# Patient Record
Sex: FEMALE | Race: WHITE | NOT HISPANIC OR LATINO | Employment: FULL TIME | URBAN - METROPOLITAN AREA
[De-identification: names, ages, dates, MRNs, and addresses within clinical notes are randomized per-mention and may not be internally consistent; named-entity substitution may affect disease eponyms.]

---

## 2019-06-04 ENCOUNTER — PATIENT OUTREACH (OUTPATIENT)
Dept: CARE COORDINATION | Facility: CLINIC | Age: 24
End: 2019-06-04

## 2019-06-04 DIAGNOSIS — F41.9 ANXIETY: Primary | ICD-10-CM

## 2019-06-04 NOTE — LETTER
June 12, 2019    Estefany Arita  [address]   [facility] [address]      Dear Estefany,    I am a clinic care coordinator who works with LINDSAY Cuellar at [facility]. I recently tried to call and was unable to reach you. I wanted to introduce myself and provide you with my contact information so that you can call me with questions or concerns about your health care. Below is a description of clinic care coordination and how I can further assist you.     The clinic care coordinator is a  who understand the health care system. The goal of clinic care coordination is to help you manage your health and improve access to the health care system in the most efficient manner. The  can assist you with financial, behavioral, psychosocial, chemical dependency, counseling, and/or psychiatric resources.    Please feel free to contact me at [phone], with any questions or concerns.     Sincerely,     Emili Hou

## 2019-06-04 NOTE — PROGRESS NOTES
Clinic Care Coordination Contact  [facility]/Voicemail    Referral Source: PCP  Clinical Data: Care Coordinator Outreach  Outreach attempted x 2.  Left message on voicemail with call back information and requested return call. Please see contact log for detailed outreach attempt dates.  Plan: Care Coordinator will mail out care coordination introduction letter with care coordinator contact information and explanation of care coordination services. Care Coordinator will do no further outreaches at this time.    DANIS Ravi  Clinic Care Coordinator  [phone]

## 2022-08-22 ENCOUNTER — TRANSFERRED RECORDS (OUTPATIENT)
Dept: HEALTH INFORMATION MANAGEMENT | Facility: CLINIC | Age: 27
End: 2022-08-22

## 2022-12-30 ENCOUNTER — TRANSCRIBE ORDERS (OUTPATIENT)
Dept: MATERNAL FETAL MEDICINE | Facility: HOSPITAL | Age: 27
End: 2022-12-30

## 2022-12-30 ENCOUNTER — MEDICAL CORRESPONDENCE (OUTPATIENT)
Dept: HEALTH INFORMATION MANAGEMENT | Facility: CLINIC | Age: 27
End: 2022-12-30

## 2022-12-30 DIAGNOSIS — O26.90 PREGNANCY RELATED CONDITION, ANTEPARTUM: Primary | ICD-10-CM

## 2023-01-02 ENCOUNTER — TRANSCRIBE ORDERS (OUTPATIENT)
Dept: MATERNAL FETAL MEDICINE | Facility: HOSPITAL | Age: 28
End: 2023-01-02

## 2023-01-02 DIAGNOSIS — O26.90 PREGNANCY RELATED CONDITION, ANTEPARTUM: Primary | ICD-10-CM

## 2023-01-04 ENCOUNTER — PRE VISIT (OUTPATIENT)
Dept: MATERNAL FETAL MEDICINE | Facility: HOSPITAL | Age: 28
End: 2023-01-04

## 2023-01-05 ENCOUNTER — OFFICE VISIT (OUTPATIENT)
Dept: MATERNAL FETAL MEDICINE | Facility: HOSPITAL | Age: 28
End: 2023-01-05
Attending: ADVANCED PRACTICE MIDWIFE
Payer: COMMERCIAL

## 2023-01-05 ENCOUNTER — ANCILLARY PROCEDURE (OUTPATIENT)
Dept: ULTRASOUND IMAGING | Facility: HOSPITAL | Age: 28
End: 2023-01-05
Attending: ADVANCED PRACTICE MIDWIFE
Payer: COMMERCIAL

## 2023-01-05 DIAGNOSIS — O26.90 PREGNANCY RELATED CONDITION, ANTEPARTUM: ICD-10-CM

## 2023-01-05 DIAGNOSIS — O98.512 COVID-19 AFFECTING PREGNANCY IN SECOND TRIMESTER: ICD-10-CM

## 2023-01-05 DIAGNOSIS — U07.1 COVID-19 AFFECTING PREGNANCY IN SECOND TRIMESTER: ICD-10-CM

## 2023-01-05 DIAGNOSIS — O36.5920 MATERNAL CARE FOR OTHER KNOWN OR SUSPECTED POOR FETAL GROWTH, SECOND TRIMESTER, NOT APPLICABLE OR UNSPECIFIED: Primary | ICD-10-CM

## 2023-01-05 PROCEDURE — 76811 OB US DETAILED SNGL FETUS: CPT | Mod: 26 | Performed by: OBSTETRICS & GYNECOLOGY

## 2023-01-05 PROCEDURE — 99202 OFFICE O/P NEW SF 15 MIN: CPT | Mod: 25 | Performed by: OBSTETRICS & GYNECOLOGY

## 2023-01-05 PROCEDURE — 76811 OB US DETAILED SNGL FETUS: CPT

## 2023-01-05 NOTE — PROGRESS NOTES
"Please see \"Imaging\" tab under \"Chart Review\" for details of today's visit.    Beatrice Muñoz    "

## 2023-01-20 LAB
ABO (EXTERNAL): NORMAL
HEPATITIS B SURFACE ANTIGEN (EXTERNAL): NEGATIVE
RH (EXTERNAL): NEGATIVE
RUBELLA ANTIBODY IGG (EXTERNAL): NORMAL
TREPONEMA PALLIDUM ANTIBODY (EXTERNAL): NONREACTIVE

## 2023-02-09 ENCOUNTER — ANCILLARY PROCEDURE (OUTPATIENT)
Dept: ULTRASOUND IMAGING | Facility: HOSPITAL | Age: 28
End: 2023-02-09
Attending: OBSTETRICS & GYNECOLOGY
Payer: COMMERCIAL

## 2023-02-09 ENCOUNTER — OFFICE VISIT (OUTPATIENT)
Dept: MATERNAL FETAL MEDICINE | Facility: HOSPITAL | Age: 28
End: 2023-02-09
Attending: OBSTETRICS & GYNECOLOGY
Payer: COMMERCIAL

## 2023-02-09 DIAGNOSIS — O98.512 COVID-19 AFFECTING PREGNANCY IN SECOND TRIMESTER: ICD-10-CM

## 2023-02-09 DIAGNOSIS — Z03.74 FETAL GROWTH PROBLEM SUSPECTED BUT NOT FOUND: Primary | ICD-10-CM

## 2023-02-09 DIAGNOSIS — U07.1 COVID-19 AFFECTING PREGNANCY IN SECOND TRIMESTER: ICD-10-CM

## 2023-02-09 PROCEDURE — 76816 OB US FOLLOW-UP PER FETUS: CPT

## 2023-02-09 PROCEDURE — 76816 OB US FOLLOW-UP PER FETUS: CPT | Mod: 26 | Performed by: OBSTETRICS & GYNECOLOGY

## 2023-02-09 PROCEDURE — 99207 PR NO CHARGE LOS: CPT | Performed by: OBSTETRICS & GYNECOLOGY

## 2023-02-09 NOTE — NURSING NOTE
Patient reports good fetal movements, denies pain, denies contractions, leaking of fluid, or bleeding. SBAR given to MATHIEU MARTIN, see their note in Epic.

## 2023-02-09 NOTE — PROGRESS NOTES
Please see the imaging tab for details of the ultrasound performed today.    Jennifer Stanley MD  Specialist in Maternal-Fetal Medicine

## 2023-03-13 LAB — GROUP B STREPTOCOCCUS (EXTERNAL): POSITIVE

## 2023-03-18 ENCOUNTER — HOSPITAL ENCOUNTER (OUTPATIENT)
Facility: HOSPITAL | Age: 28
Discharge: HOME | End: 2023-03-18
Attending: ADVANCED PRACTICE MIDWIFE | Admitting: ADVANCED PRACTICE MIDWIFE
Payer: COMMERCIAL

## 2023-03-18 VITALS — DIASTOLIC BLOOD PRESSURE: 72 MMHG | RESPIRATION RATE: 16 BRPM | SYSTOLIC BLOOD PRESSURE: 133 MMHG | TEMPERATURE: 98.3 F

## 2023-03-18 LAB
CLUE CELLS: NORMAL
RUPTURE OF FETAL MEMBRANES BY ROM PLUS: NEGATIVE
TRICHOMONAS, WET PREP: NORMAL
WBC'S/HIGH POWER FIELD, WET PREP: NORMAL
YEAST, WET PREP: NORMAL

## 2023-03-18 PROCEDURE — 87210 SMEAR WET MOUNT SALINE/INK: CPT | Performed by: ADVANCED PRACTICE MIDWIFE

## 2023-03-18 PROCEDURE — 84112 EVAL AMNIOTIC FLUID PROTEIN: CPT | Performed by: ADVANCED PRACTICE MIDWIFE

## 2023-03-18 PROCEDURE — G0463 HOSPITAL OUTPT CLINIC VISIT: HCPCS

## 2023-03-18 RX ORDER — VITAMIN A ACETATE, .BETA.-CAROTENE, ASCORBIC ACID, CHOLECALCIFEROL, .ALPHA.-TOCOPHEROL ACETATE, DL-, THIAMINE MONONITRATE, RIBOFLAVIN, NIACINAMIDE, PYRIDOXINE HYDROCHLORIDE, FOLIC ACID, CYANOCOBALAMIN, CALCIUM CARBONATE, FERROUS FUMARATE, ZINC OXIDE, AND CUPRIC OXIDE 2000; 2000; 120; 400; 22; 1.84; 3; 20; 10; 1; 12; 200; 27; 25; 2 [IU]/1; [IU]/1; MG/1; [IU]/1; MG/1; MG/1; MG/1; MG/1; MG/1; MG/1; UG/1; MG/1; MG/1; MG/1; MG/1
1 TABLET ORAL DAILY
COMMUNITY

## 2023-03-18 RX ORDER — LIDOCAINE 40 MG/G
CREAM TOPICAL
Status: DISCONTINUED | OUTPATIENT
Start: 2023-03-18 | End: 2023-03-18 | Stop reason: HOSPADM

## 2023-03-18 RX ORDER — ASPIRIN 81 MG/1
81 TABLET, CHEWABLE ORAL DAILY
Status: ON HOLD | COMMUNITY
End: 2023-03-27

## 2023-03-18 ASSESSMENT — ACTIVITIES OF DAILY LIVING (ADL): ADLS_ACUITY_SCORE: 31

## 2023-03-18 NOTE — PROGRESS NOTES
Estefany, a 28 year-old  at 37w1d gestation presents to the [facility] reporting having leaking of fluid vs urinating with coughing through the day on 3/17. She reports that she laid down in bed and either lost a lot of urine or had SROM and was directed to [facility]. She reports that she has been experiencing some labial discomfort and pressure.    O: /72 (BP Location: Right arm, Patient Position: Sitting, Cuff Size: Adult Regular)   Temp 98.3  F (36.8  C) (Oral)   Resp 16   LMP 2022   EFM: 135 baseline, moderate variability, +accelerations, no decelerations. There are periods where maternal heart rate is traced due to positioning per RN  [place]: irregular; patient reports they are not strong  SVE: 2/60/-2    Recent Results (from the past 24 hour(s))   Rupture of Fetal Membranes by ROM Plus    Collection Time: 23 12:51 AM   Result Value Ref Range    Rupture of Fetal Membranes by ROM Plus Negative Negative, Invalid, Suggest Repeat   Wet preparation    Collection Time: 23  1:39 AM    Specimen: Vagina; Swab   Result Value Ref Range    Trichomonas Absent Absent    Yeast Absent Absent    Clue Cells Absent Absent    WBCs/high power field None None     A: 28 year-old  at 37w1d with stress incontinence    P: Reviewed labs, plan to discharge to home. Reviewed s/s of labor and when to return to [facility].    VESNA Redd CNM

## 2023-03-18 NOTE — DISCHARGE INSTRUCTIONS
Discharge Instruction for Undelivered Patients      You were seen for: Membrane Assessment  We Consulted: Joseline Witt  You had (Test or Medicine):ROM+, wet prep    Diet:   Drink 8 to 12 glasses of liquids (milk, juice, water) every day.  You may eat meals and snacks.     Activity:  Count fetal kicks everyday (see handout)  Call your doctor or nurse midwife if your baby is moving less than usual.     Call your provider if you notice:  Swelling in your face or increased swelling in your hands or legs.  Headaches that are not relieved by Tylenol (acetaminophen).  Changes in your vision (blurring: seeing spots or stars.)  Nausea (sick to your stomach) and vomiting (throwing up).   Weight gain of 5 pounds or more per week.  Heartburn that doesn't go away.  Signs of bladder infection: pain when you urinate (use the toilet), need to go more often and more urgently.  The bag of wright (rupture of membranes) breaks, or you notice leaking in your underwear.  Bright red blood in your underwear.  Abdominal (lower belly) or stomach pain.  For first baby: Contractions (tightening) less than 5 minutes apart for one hour or more.  Second (plus) baby: Contractions (tightening) less than 10 minutes apart and getting stronger.  *If less than 34 weeks: Contractions (tightening) more than 6 times in one hour.  Increase or change in vaginal discharge (note the color and amount)  Other:     Follow-up:  As scheduled in the clinic

## 2023-03-18 NOTE — PROGRESS NOTES
"Patient arrives to [facility] accompanied by  Nain. Patient reports possible leaking of fluid today. Patient reports continued leaking with coughing. VSS. Patient also reports lower abdominal pressure. No fluid noted on exam and patient reports vaginal discomfort described as \"feeling like I sat on a bike seat for a long time\". ROM+ obtained and pending.   "

## 2023-03-18 NOTE — PROGRESS NOTES
ROM+ and wet prep negative. Order for discharge to home at this time. EFM held in place to obtain reactive tracing. Positive fetal movement palpated. Discharge instructions reviewed with patient and ; deny questions. Discharged home at this time.

## 2023-03-20 ENCOUNTER — HOSPITAL ENCOUNTER (OUTPATIENT)
Facility: HOSPITAL | Age: 28
Discharge: HOME | End: 2023-03-20
Attending: ADVANCED PRACTICE MIDWIFE | Admitting: ADVANCED PRACTICE MIDWIFE
Payer: COMMERCIAL

## 2023-03-20 VITALS — TEMPERATURE: 98.4 F | RESPIRATION RATE: 18 BRPM | SYSTOLIC BLOOD PRESSURE: 127 MMHG | DIASTOLIC BLOOD PRESSURE: 72 MMHG

## 2023-03-20 LAB
CRYSTALS AMN MICRO: NORMAL
RUPTURE OF FETAL MEMBRANES BY ROM PLUS: NEGATIVE

## 2023-03-20 PROCEDURE — G0463 HOSPITAL OUTPT CLINIC VISIT: HCPCS

## 2023-03-20 PROCEDURE — 84112 EVAL AMNIOTIC FLUID PROTEIN: CPT | Performed by: ADVANCED PRACTICE MIDWIFE

## 2023-03-20 RX ORDER — LIDOCAINE 40 MG/G
CREAM TOPICAL
Status: DISCONTINUED | OUTPATIENT
Start: 2023-03-20 | End: 2023-03-20 | Stop reason: HOSPADM

## 2023-03-20 ASSESSMENT — ACTIVITIES OF DAILY LIVING (ADL): ADLS_ACUITY_SCORE: 35

## 2023-03-20 NOTE — PROGRESS NOTES
Estefany presents to [facility] after episode of leaking. She states the leaking soaked her underwear but did not continue. Reports off and on periods of mild cramping. Fern slide collected. No obvious pool of liquid visualized. ROM plus also collected. Will await results.

## 2023-03-25 ENCOUNTER — HOSPITAL ENCOUNTER (INPATIENT)
Facility: HOSPITAL | Age: 28
LOS: 1 days | Discharge: HOME HEALTH | End: 2023-03-27
Attending: ADVANCED PRACTICE MIDWIFE | Admitting: ADVANCED PRACTICE MIDWIFE
Payer: COMMERCIAL

## 2023-03-25 DIAGNOSIS — O34.219 VBAC, DELIVERED: ICD-10-CM

## 2023-03-25 DIAGNOSIS — Z37.9 NORMAL LABOR: ICD-10-CM

## 2023-03-26 ENCOUNTER — ANESTHESIA (OUTPATIENT)
Dept: OBGYN | Facility: HOSPITAL | Age: 28
End: 2023-03-26
Payer: COMMERCIAL

## 2023-03-26 ENCOUNTER — ANESTHESIA EVENT (OUTPATIENT)
Dept: OBGYN | Facility: HOSPITAL | Age: 28
End: 2023-03-26
Payer: COMMERCIAL

## 2023-03-26 PROBLEM — Z37.9 NORMAL LABOR: Status: ACTIVE | Noted: 2023-03-26

## 2023-03-26 LAB
ABO/RH(D): NORMAL
ABO/RH(D): NORMAL
ANTIBODY SCREEN: NEGATIVE
FETAL BLEED SCREEN: NORMAL
HGB BLD-MCNC: 13.1 G/DL (ref 11.7–15.7)
HOLD SPECIMEN: NORMAL
PLATELET # BLD AUTO: 197 10E3/UL (ref 150–450)
SPECIMEN EXPIRATION DATE: NORMAL
SPECIMEN EXPIRATION DATE: NORMAL
T PALLIDUM AB SER QL: NONREACTIVE

## 2023-03-26 PROCEDURE — 250N000011 HC RX IP 250 OP 636: Performed by: ADVANCED PRACTICE MIDWIFE

## 2023-03-26 PROCEDURE — 36415 COLL VENOUS BLD VENIPUNCTURE: CPT | Performed by: OBSTETRICS & GYNECOLOGY

## 2023-03-26 PROCEDURE — 00HU33Z INSERTION OF INFUSION DEVICE INTO SPINAL CANAL, PERCUTANEOUS APPROACH: ICD-10-PCS | Performed by: ANESTHESIOLOGY

## 2023-03-26 PROCEDURE — 85049 AUTOMATED PLATELET COUNT: CPT | Performed by: ADVANCED PRACTICE MIDWIFE

## 2023-03-26 PROCEDURE — 86850 RBC ANTIBODY SCREEN: CPT | Performed by: ADVANCED PRACTICE MIDWIFE

## 2023-03-26 PROCEDURE — 85018 HEMOGLOBIN: CPT | Performed by: ADVANCED PRACTICE MIDWIFE

## 2023-03-26 PROCEDURE — 85461 HEMOGLOBIN FETAL: CPT | Performed by: OBSTETRICS & GYNECOLOGY

## 2023-03-26 PROCEDURE — 36415 COLL VENOUS BLD VENIPUNCTURE: CPT | Performed by: ADVANCED PRACTICE MIDWIFE

## 2023-03-26 PROCEDURE — 250N000011 HC RX IP 250 OP 636: Performed by: ANESTHESIOLOGY

## 2023-03-26 PROCEDURE — 250N000009 HC RX 250: Performed by: ADVANCED PRACTICE MIDWIFE

## 2023-03-26 PROCEDURE — 87389 HIV-1 AG W/HIV-1&-2 AB AG IA: CPT | Performed by: ADVANCED PRACTICE MIDWIFE

## 2023-03-26 PROCEDURE — 370N000003 HC ANESTHESIA WARD SERVICE

## 2023-03-26 PROCEDURE — 3E0R3BZ INTRODUCTION OF ANESTHETIC AGENT INTO SPINAL CANAL, PERCUTANEOUS APPROACH: ICD-10-PCS | Performed by: ANESTHESIOLOGY

## 2023-03-26 PROCEDURE — 722N000001 HC LABOR CARE VAGINAL DELIVERY SINGLE

## 2023-03-26 PROCEDURE — 0HQ9XZZ REPAIR PERINEUM SKIN, EXTERNAL APPROACH: ICD-10-PCS | Performed by: ADVANCED PRACTICE MIDWIFE

## 2023-03-26 PROCEDURE — 250N000013 HC RX MED GY IP 250 OP 250 PS 637: Performed by: ADVANCED PRACTICE MIDWIFE

## 2023-03-26 PROCEDURE — 86780 TREPONEMA PALLIDUM: CPT | Performed by: ADVANCED PRACTICE MIDWIFE

## 2023-03-26 PROCEDURE — 120N000001 HC R&B MED SURG/OB

## 2023-03-26 PROCEDURE — 10907ZC DRAINAGE OF AMNIOTIC FLUID, THERAPEUTIC FROM PRODUCTS OF CONCEPTION, VIA NATURAL OR ARTIFICIAL OPENING: ICD-10-PCS | Performed by: ADVANCED PRACTICE MIDWIFE

## 2023-03-26 RX ORDER — PENICILLIN G 3000000 [IU]/50ML
3 INJECTION, SOLUTION INTRAVENOUS EVERY 4 HOURS
Status: DISCONTINUED | OUTPATIENT
Start: 2023-03-26 | End: 2023-03-26 | Stop reason: HOSPADM

## 2023-03-26 RX ORDER — CARBOPROST TROMETHAMINE 250 UG/ML
250 INJECTION, SOLUTION INTRAMUSCULAR
Status: DISCONTINUED | OUTPATIENT
Start: 2023-03-26 | End: 2023-03-27 | Stop reason: HOSPADM

## 2023-03-26 RX ORDER — NALOXONE HYDROCHLORIDE 0.4 MG/ML
0.4 INJECTION, SOLUTION INTRAMUSCULAR; INTRAVENOUS; SUBCUTANEOUS
Status: DISCONTINUED | OUTPATIENT
Start: 2023-03-26 | End: 2023-03-26 | Stop reason: HOSPADM

## 2023-03-26 RX ORDER — PROCHLORPERAZINE MALEATE 10 MG
10 TABLET ORAL EVERY 6 HOURS PRN
Status: DISCONTINUED | OUTPATIENT
Start: 2023-03-26 | End: 2023-03-26 | Stop reason: HOSPADM

## 2023-03-26 RX ORDER — FENTANYL CITRATE-0.9 % NACL/PF 10 MCG/ML
100 PLASTIC BAG, INJECTION (ML) INTRAVENOUS EVERY 5 MIN PRN
Status: DISCONTINUED | OUTPATIENT
Start: 2023-03-26 | End: 2023-03-26 | Stop reason: HOSPADM

## 2023-03-26 RX ORDER — NALOXONE HYDROCHLORIDE 0.4 MG/ML
0.2 INJECTION, SOLUTION INTRAMUSCULAR; INTRAVENOUS; SUBCUTANEOUS
Status: DISCONTINUED | OUTPATIENT
Start: 2023-03-26 | End: 2023-03-27 | Stop reason: HOSPADM

## 2023-03-26 RX ORDER — NALOXONE HYDROCHLORIDE 0.4 MG/ML
0.2 INJECTION, SOLUTION INTRAMUSCULAR; INTRAVENOUS; SUBCUTANEOUS
Status: DISCONTINUED | OUTPATIENT
Start: 2023-03-26 | End: 2023-03-26 | Stop reason: HOSPADM

## 2023-03-26 RX ORDER — CARBOPROST TROMETHAMINE 250 UG/ML
250 INJECTION, SOLUTION INTRAMUSCULAR
Status: DISCONTINUED | OUTPATIENT
Start: 2023-03-26 | End: 2023-03-26 | Stop reason: HOSPADM

## 2023-03-26 RX ORDER — ONDANSETRON 4 MG/1
4 TABLET, ORALLY DISINTEGRATING ORAL EVERY 6 HOURS PRN
Status: DISCONTINUED | OUTPATIENT
Start: 2023-03-26 | End: 2023-03-26 | Stop reason: HOSPADM

## 2023-03-26 RX ORDER — OXYCODONE HYDROCHLORIDE 5 MG/1
5 TABLET ORAL
Status: DISCONTINUED | OUTPATIENT
Start: 2023-03-26 | End: 2023-03-27 | Stop reason: HOSPADM

## 2023-03-26 RX ORDER — MISOPROSTOL 200 UG/1
400 TABLET ORAL
Status: DISCONTINUED | OUTPATIENT
Start: 2023-03-26 | End: 2023-03-26 | Stop reason: HOSPADM

## 2023-03-26 RX ORDER — NALOXONE HYDROCHLORIDE 0.4 MG/ML
0.4 INJECTION, SOLUTION INTRAMUSCULAR; INTRAVENOUS; SUBCUTANEOUS
Status: DISCONTINUED | OUTPATIENT
Start: 2023-03-26 | End: 2023-03-27 | Stop reason: HOSPADM

## 2023-03-26 RX ORDER — KETOROLAC TROMETHAMINE 30 MG/ML
30 INJECTION, SOLUTION INTRAMUSCULAR; INTRAVENOUS
Status: DISCONTINUED | OUTPATIENT
Start: 2023-03-26 | End: 2023-03-27 | Stop reason: HOSPADM

## 2023-03-26 RX ORDER — OXYTOCIN 10 [USP'U]/ML
10 INJECTION, SOLUTION INTRAMUSCULAR; INTRAVENOUS
Status: DISCONTINUED | OUTPATIENT
Start: 2023-03-26 | End: 2023-03-26 | Stop reason: HOSPADM

## 2023-03-26 RX ORDER — METHYLERGONOVINE MALEATE 0.2 MG/ML
200 INJECTION INTRAVENOUS
Status: DISCONTINUED | OUTPATIENT
Start: 2023-03-26 | End: 2023-03-27 | Stop reason: HOSPADM

## 2023-03-26 RX ORDER — FENTANYL CITRATE 50 UG/ML
50 INJECTION, SOLUTION INTRAMUSCULAR; INTRAVENOUS EVERY 30 MIN PRN
Status: DISCONTINUED | OUTPATIENT
Start: 2023-03-26 | End: 2023-03-26 | Stop reason: HOSPADM

## 2023-03-26 RX ORDER — ONDANSETRON 2 MG/ML
4 INJECTION INTRAMUSCULAR; INTRAVENOUS EVERY 6 HOURS PRN
Status: DISCONTINUED | OUTPATIENT
Start: 2023-03-26 | End: 2023-03-26 | Stop reason: HOSPADM

## 2023-03-26 RX ORDER — HYDROCORTISONE 25 MG/G
CREAM TOPICAL 3 TIMES DAILY PRN
Status: DISCONTINUED | OUTPATIENT
Start: 2023-03-26 | End: 2023-03-27 | Stop reason: HOSPADM

## 2023-03-26 RX ORDER — LIDOCAINE 40 MG/G
CREAM TOPICAL
Status: DISCONTINUED | OUTPATIENT
Start: 2023-03-26 | End: 2023-03-26 | Stop reason: HOSPADM

## 2023-03-26 RX ORDER — NALBUPHINE HYDROCHLORIDE 10 MG/ML
2.5-5 INJECTION, SOLUTION INTRAMUSCULAR; INTRAVENOUS; SUBCUTANEOUS EVERY 6 HOURS PRN
Status: DISCONTINUED | OUTPATIENT
Start: 2023-03-26 | End: 2023-03-27 | Stop reason: HOSPADM

## 2023-03-26 RX ORDER — METOCLOPRAMIDE 10 MG/1
10 TABLET ORAL EVERY 6 HOURS PRN
Status: DISCONTINUED | OUTPATIENT
Start: 2023-03-26 | End: 2023-03-26 | Stop reason: HOSPADM

## 2023-03-26 RX ORDER — OXYTOCIN 10 [USP'U]/ML
10 INJECTION, SOLUTION INTRAMUSCULAR; INTRAVENOUS
Status: DISCONTINUED | OUTPATIENT
Start: 2023-03-26 | End: 2023-03-27 | Stop reason: HOSPADM

## 2023-03-26 RX ORDER — OXYTOCIN/0.9 % SODIUM CHLORIDE 30/500 ML
340 PLASTIC BAG, INJECTION (ML) INTRAVENOUS CONTINUOUS PRN
Status: DISCONTINUED | OUTPATIENT
Start: 2023-03-26 | End: 2023-03-27 | Stop reason: HOSPADM

## 2023-03-26 RX ORDER — BISACODYL 10 MG
10 SUPPOSITORY, RECTAL RECTAL DAILY PRN
Status: DISCONTINUED | OUTPATIENT
Start: 2023-03-26 | End: 2023-03-27 | Stop reason: HOSPADM

## 2023-03-26 RX ORDER — MODIFIED LANOLIN
OINTMENT (GRAM) TOPICAL
Status: DISCONTINUED | OUTPATIENT
Start: 2023-03-26 | End: 2023-03-27 | Stop reason: HOSPADM

## 2023-03-26 RX ORDER — PROCHLORPERAZINE 25 MG
25 SUPPOSITORY, RECTAL RECTAL EVERY 12 HOURS PRN
Status: DISCONTINUED | OUTPATIENT
Start: 2023-03-26 | End: 2023-03-26 | Stop reason: HOSPADM

## 2023-03-26 RX ORDER — MISOPROSTOL 200 UG/1
400 TABLET ORAL
Status: DISCONTINUED | OUTPATIENT
Start: 2023-03-26 | End: 2023-03-27 | Stop reason: HOSPADM

## 2023-03-26 RX ORDER — METOCLOPRAMIDE HYDROCHLORIDE 5 MG/ML
10 INJECTION INTRAMUSCULAR; INTRAVENOUS EVERY 6 HOURS PRN
Status: DISCONTINUED | OUTPATIENT
Start: 2023-03-26 | End: 2023-03-26 | Stop reason: HOSPADM

## 2023-03-26 RX ORDER — OXYCODONE HYDROCHLORIDE 5 MG/1
5 TABLET ORAL EVERY 4 HOURS PRN
Status: DISCONTINUED | OUTPATIENT
Start: 2023-03-26 | End: 2023-03-27 | Stop reason: HOSPADM

## 2023-03-26 RX ORDER — BUPIVACAINE HYDROCHLORIDE 2.5 MG/ML
INJECTION, SOLUTION EPIDURAL; INFILTRATION; INTRACAUDAL
Status: COMPLETED | OUTPATIENT
Start: 2023-03-26 | End: 2023-03-26

## 2023-03-26 RX ORDER — OXYTOCIN/0.9 % SODIUM CHLORIDE 30/500 ML
100-340 PLASTIC BAG, INJECTION (ML) INTRAVENOUS CONTINUOUS PRN
Status: DISCONTINUED | OUTPATIENT
Start: 2023-03-26 | End: 2023-03-27 | Stop reason: HOSPADM

## 2023-03-26 RX ORDER — MISOPROSTOL 200 UG/1
800 TABLET ORAL
Status: DISCONTINUED | OUTPATIENT
Start: 2023-03-26 | End: 2023-03-26 | Stop reason: HOSPADM

## 2023-03-26 RX ORDER — MISOPROSTOL 200 UG/1
800 TABLET ORAL
Status: DISCONTINUED | OUTPATIENT
Start: 2023-03-26 | End: 2023-03-27 | Stop reason: HOSPADM

## 2023-03-26 RX ORDER — FENTANYL/ROPIVACAINE/NS/PF 2MCG/ML-.1
PLASTIC BAG, INJECTION (ML) EPIDURAL
Status: DISCONTINUED | OUTPATIENT
Start: 2023-03-26 | End: 2023-03-26 | Stop reason: HOSPADM

## 2023-03-26 RX ORDER — METHYLERGONOVINE MALEATE 0.2 MG/ML
200 INJECTION INTRAVENOUS
Status: DISCONTINUED | OUTPATIENT
Start: 2023-03-26 | End: 2023-03-26 | Stop reason: HOSPADM

## 2023-03-26 RX ORDER — CITRIC ACID/SODIUM CITRATE 334-500MG
30 SOLUTION, ORAL ORAL
Status: DISCONTINUED | OUTPATIENT
Start: 2023-03-26 | End: 2023-03-26 | Stop reason: HOSPADM

## 2023-03-26 RX ORDER — PENICILLIN G POTASSIUM 5000000 [IU]/1
5 INJECTION, POWDER, FOR SOLUTION INTRAMUSCULAR; INTRAVENOUS ONCE
Status: COMPLETED | OUTPATIENT
Start: 2023-03-26 | End: 2023-03-26

## 2023-03-26 RX ORDER — OXYTOCIN/0.9 % SODIUM CHLORIDE 30/500 ML
340 PLASTIC BAG, INJECTION (ML) INTRAVENOUS CONTINUOUS PRN
Status: DISCONTINUED | OUTPATIENT
Start: 2023-03-26 | End: 2023-03-26 | Stop reason: HOSPADM

## 2023-03-26 RX ORDER — ACETAMINOPHEN 325 MG/1
650 TABLET ORAL EVERY 4 HOURS PRN
Status: DISCONTINUED | OUTPATIENT
Start: 2023-03-26 | End: 2023-03-26 | Stop reason: HOSPADM

## 2023-03-26 RX ORDER — DOCUSATE SODIUM 100 MG/1
100 CAPSULE, LIQUID FILLED ORAL DAILY
Status: DISCONTINUED | OUTPATIENT
Start: 2023-03-26 | End: 2023-03-27 | Stop reason: HOSPADM

## 2023-03-26 RX ORDER — IBUPROFEN 800 MG/1
800 TABLET, FILM COATED ORAL
Status: DISCONTINUED | OUTPATIENT
Start: 2023-03-26 | End: 2023-03-27 | Stop reason: HOSPADM

## 2023-03-26 RX ORDER — ACETAMINOPHEN 325 MG/1
650 TABLET ORAL EVERY 4 HOURS PRN
Status: DISCONTINUED | OUTPATIENT
Start: 2023-03-26 | End: 2023-03-27 | Stop reason: HOSPADM

## 2023-03-26 RX ORDER — IBUPROFEN 800 MG/1
800 TABLET, FILM COATED ORAL EVERY 6 HOURS PRN
Status: DISCONTINUED | OUTPATIENT
Start: 2023-03-26 | End: 2023-03-27 | Stop reason: HOSPADM

## 2023-03-26 RX ADMIN — Medication 340 ML/HR: at 09:15

## 2023-03-26 RX ADMIN — LIDOCAINE HYDROCHLORIDE 20 ML: 10 INJECTION, SOLUTION EPIDURAL; INFILTRATION; INTRACAUDAL; PERINEURAL at 09:17

## 2023-03-26 RX ADMIN — PENICILLIN G 3 MILLION UNITS: 3000000 INJECTION, SOLUTION INTRAVENOUS at 05:19

## 2023-03-26 RX ADMIN — Medication: at 06:36

## 2023-03-26 RX ADMIN — IBUPROFEN 800 MG: 800 TABLET ORAL at 19:31

## 2023-03-26 RX ADMIN — IBUPROFEN 800 MG: 800 TABLET ORAL at 12:40

## 2023-03-26 RX ADMIN — PENICILLIN G POTASSIUM 5 MILLION UNITS: 5000000 POWDER, FOR SOLUTION INTRAMUSCULAR; INTRAPLEURAL; INTRATHECAL; INTRAVENOUS at 01:24

## 2023-03-26 RX ADMIN — BUPIVACAINE HYDROCHLORIDE 5 ML: 2.5 INJECTION, SOLUTION EPIDURAL; INFILTRATION; INTRACAUDAL at 06:38

## 2023-03-26 RX ADMIN — DOCUSATE SODIUM 100 MG: 100 CAPSULE ORAL at 11:11

## 2023-03-26 RX ADMIN — HUMAN RHO(D) IMMUNE GLOBULIN 300 MCG: 1500 SOLUTION INTRAMUSCULAR; INTRAVENOUS at 13:19

## 2023-03-26 ASSESSMENT — ACTIVITIES OF DAILY LIVING (ADL)
ADLS_ACUITY_SCORE: 18
CONCENTRATING,_REMEMBERING_OR_MAKING_DECISIONS_DIFFICULTY: NO
ADLS_ACUITY_SCORE: 18
ADLS_ACUITY_SCORE: 31
TOILETING_ISSUES: NO
ADLS_ACUITY_SCORE: 18
DRESSING/BATHING_DIFFICULTY: NO
ADLS_ACUITY_SCORE: 18
CHANGE_IN_FUNCTIONAL_STATUS_SINCE_ONSET_OF_CURRENT_ILLNESS/INJURY: NO
DOING_ERRANDS_INDEPENDENTLY_DIFFICULTY: NO
ADLS_ACUITY_SCORE: 18
WALKING_OR_CLIMBING_STAIRS_DIFFICULTY: NO
FALL_HISTORY_WITHIN_LAST_SIX_MONTHS: NO
DIFFICULTY_EATING/SWALLOWING: NO
ADLS_ACUITY_SCORE: 18
ADLS_ACUITY_SCORE: 18
WEAR_GLASSES_OR_BLIND: NO
ADLS_ACUITY_SCORE: 18
ADLS_ACUITY_SCORE: 18

## 2023-03-26 NOTE — H&P
HISTORY AND PHYSICAL UPDATE ADMISSION EXAM    Name: Estefany Mejia  YOB: 1995  Medical Record Number: [id number]    History of Present Illness: Estefany Mejia is a 28 year old female who is 38w2d pregnant and being admitted for active labor management (TOLAC) and antibiotic therapy for positive GBS. EDC of 2023 based on first trimester US.  Estefany began her prenatal care in [place] and transferred care to [facility] at 24.3 weeks. She desires a TOLAC and has a history of primary CS for fetal intolerance following IOL at 34 weeks due to GHTN and IUGR. She was consented for TOLAC on 2023. Growth US have been followed by MFM with most recent EFW in - (15%) at 36.6 weeks. Weekly BPPs began at 36 weeks and have been /.  She has been taking LDASA since 12 weeks for early covid infection as well as her GHTN hx. She has been normotensive throughout this pregnancy. She is GBS positive.  Estimated Date of Delivery: 2023    EGA 38w2d    OB History    Para Term  AB Living   2 1 0 1 0 1   SAB IAB Ectopic Multiple Live Births   0 0 0 0 1      # Outcome Date GA Lbr Juan/2nd Weight Sex Delivery Anes PTL Lv   2 Current            1  2020 34w0d  1.644 kg (3 lb 10 oz)  -SEC   ALEXANDRA        No results found for: ABO, RH, AS, HEPBANG, CHPCRT, GCPCRT, TREPAB, RUBELLAABIGG, HGB, HIV    Prenatal Complications:  1. Hx CS, desires TOLAC  2. GBS positive  3. Hx GHTN  4. EFW 15%      Exam:      /80 (BP Location: Right arm, Patient Position: Semi-Holden's, Cuff Size: Adult Regular)   Temp 98.4  F (36.9  C) (Oral)   Resp 18   LMP 2022     Fetal heart Rate Category 1  Contractions q5-6    HEENT grossly normal  Neck: no lymphadenopathy or thryoidomegaly  ABD gravid, non-tender  EXT:  no edema, moves freely  Vaginal exam /-2 per RN  Membranes: intact    Assessment: active labor management and antibiotic therapy    Plan: Admit - see IP orders  Pain medication  epidural as desired  Prophylactic antibiotic for + GBS status  Anticipate     Prenatal record reviewed.     aware of patient status and remains available for consultation and collaboration as needed.      Ross Eckert CNM      3/26/2023   1:03 AM

## 2023-03-26 NOTE — ANESTHESIA POSTPROCEDURE EVALUATION
Patient: Estefany Mejia    Procedure: * No procedures listed *       Anesthesia Type:  Epidural    Note:  Disposition: Inpatient   Postop Pain Control: Uneventful            Sign Out: Well controlled pain   PONV: No   Neuro/Psych: Uneventful            Sign Out: Acceptable/Baseline neuro status   Airway/Respiratory: Uneventful            Sign Out: Acceptable/Baseline resp. status   CV/Hemodynamics: Uneventful            Sign Out: Acceptable CV status; No obvious hypovolemia; No obvious fluid overload   Other NRE:    DID A NON-ROUTINE EVENT OCCUR?     Event details/Postop Comments:  The patient has no back pain, no headache. The effects of the epidural have worn off. No need for follow up.           Last vitals:  Vitals:    03/26/23 1118 03/26/23 1320 03/26/23 1530   BP: 125/71 115/61 118/71   Pulse:  68 69   Resp:  20 18   Temp:  36.9  C (98.5  F) 36.9  C (98.5  F)   SpO2:  97% 97%       Electronically Signed By: Ori Olivo MD  March 26, 2023  4:45 PM

## 2023-03-26 NOTE — PROGRESS NOTES
Patient being repositioned q45 minutes. Patient reporting increase in intensity of contractions. Contractions palpating moderate.

## 2023-03-26 NOTE — PROGRESS NOTES
Between 0700 amd 0723 writer at bedside and listening and watching EFM. Baby's HR audible to 120's-130's but machine reading it at 63. Patient and US repositioned and EFM reading properly.

## 2023-03-26 NOTE — ANESTHESIA PROCEDURE NOTES
"Epidural catheter Procedure Note    Pre-Procedure   Staff -        Anesthesiologist:  Alize Sinclair MD       Performed By: anesthesiologist       Location: OB       Procedure Start/Stop Times: 3/26/2023 6:24 AM and 3/26/2023 6:41 AM       Pre-Anesthestic Checklist: patient identified, IV checked, risks and benefits discussed, informed consent, monitors and equipment checked, pre-op evaluation, at physician/surgeon's request and post-op pain management  Timeout:       Correct Patient: Yes        Correct Procedure: Yes        Correct Site: Yes        Correct Position: Yes   Procedure Documentation  Procedure: epidural catheter       Patient Position: sitting       Patient Prep/Sterile Barriers: sterile gloves, mask, patient draped       Skin prep: Chloraprep       Local skin infiltrated with mL of 2% lidocaine.        Insertion Site: L2-3. (midline approach).       Technique: LORT saline        LAMBERT at 5 cm.       Needle Type: [employer]y needle       Needle Gauge: 17.        Needle Length (Inches): 3.5        Catheter: 18 G.          Catheter threaded easily.         4 cm epidural space.         Threaded 9 cm at skin.         # of attempts: 1 and  # of redirects:  0    Assessment/Narrative         Paresthesias: No.       Test dose of 3 mL lidocaine 1.5% w/ 1:200,000 epinephrine at 06:35 CDT.         Test dose negative, 3 minutes after injection, for signs of intravascular, subdural, or intrathecal injection.       Insertion/Infusion Method: LORT saline       No aspiration negative for Heme or CSF via Epidural Catheter.       Sensory Level Left: T10.       Sensory Level Right: T10.    Medication(s) Administered   0.25% Bupivacaine PF (Epidural) - EPIDURAL   5 mL - 3/26/2023 6:38:00 AM  Medication Administration Time: 3/26/2023 6:24 AM      FOR [facility] ([facility] ONLY:   Pain Team Contact information: please page the Pain Team Via [employer]. Search \"Pain\". During daytime hours, please page the attending first. At " night please page the resident first.

## 2023-03-26 NOTE — PROGRESS NOTES
"LABOR PROGRESS NOTE  IUP at 38w2d     SUBJECTIVE:  Pain has been manageable with position changes and she did get some sleep.  OBJECTIVE:  /80 (BP Location: Right arm, Patient Position: Semi-Holden's, Cuff Size: Adult Regular)   Temp 98.4  F (36.9  C) (Oral)   Resp 18   Ht 1.626 m (5' 4\")   Wt 71.7 kg (158 lb)   LMP 2022   BMI 27.12 kg/m    FHT:  130 bpm, Variability: Moderate (6 - 25 bpm), Accelerations: present and Decelerations: Absent  CONTRACTIONS: TocoFrequency: Every 3-5 minutes, Duration: 90 seconds and Intensity: moderate  CERVIX: 5/80/-1  FLUID:  AROM    ASSESSMENT:  @38.2 weeks  TOLAC  AROM at 0525  GBS positive, treated x2      PLAN:   -Discussed R/B/A of amniotomy. She consents to this. Completed with return of clear fluid and Continue position changes to optimize fetal descent.  -Anticipate     Ross Eckert CNM  "

## 2023-03-26 NOTE — PROGRESS NOTES
Patient called RN to room reporting increased pressure with contractions. Writer notified Joseline Witt CNM who came to bedside. SVE performed and patient complete and +3.

## 2023-03-26 NOTE — L&D DELIVERY NOTE
Vaginal Delivery Note    Name: Estefany Mejia  : 1995  MRN: [id number]    PRE DELIVERY DIAGNOSIS  1) 28 year old  2 Para 0101 at 38w2d      2) Active Labor/TOLAC    POST DELIVERY DIAGNOSIS  1) 28 year old  @ 38w2d  2) Delivery of a viable female infant weighing unavailable at time of dictation via /    YOB: 2021     Birth Time: 9:14 AM       Augmentation Yes              Indication: ineffective contraction pattern  Induction No                      Indication: none    Monitors: External     GBS: Positive; received adequate Penicillin prophylaxis    ROM: AROM  Fluid Type: Clear    Labor Analgesia/Anesthesia:Epidural    Cord pH obtained: No  Placenta Date/Time: 3/26/2023  9:20 AM   Placenta submitted to Pathology: No    Description of procedure:   28 year old  with [facility] and pregnancy complicated by history of  section desiring TOLAC, GBS positive, history of gestational hypertension, and history of IUGR (EFW this fetus 15%ile) presented to L&D with labor contractions. Cervix was 4/70/-2 on admit at 0026. AROM at 0530 when cervix was 5/80/-1. She became uncomfortable and requested an epidural which was placed with good relief at 0630. Recheck after at 0715 was 8/90/-1. Began to feel an constant pressure and urge to bear down, Complete/+3 at 0900. Began pushing at 0904. She brought her baby to a rapid, controlled crown, pushing over just 4 contractions. Fetal head delivered, JOLYNN, followed by the left anterior shoulder and remainder of the infant on the same push. Estefany and Nain welcomed their daughter, Tawanna Mishra. Her hospital course was uncomplicated.  Patient progressed to complete with artificial rupture of membranes   Shoulder Dystocia No  Operative Vaginal Delivery No  Infant spontaneously delivered over an 1st degree laceration.   It was repaired in the normal fashion using local anesthesia in addition to the epidural  using 3-0 vicryl. There was a left labial abrasion, which was well approximated and hemostatic, repair discussed and ultimately declined by the patient.  Infant delivered in JOLYNN position.  Nuchal cord Yes x1   Delivered through    After a 120 second delay, cord clamped x2 and cut by the father of the infant.  Placenta spontaneously delivered: 3/26/2023  9:20 AM  grossly intact with 3 vessel cord.  Infant:  Live, vigorous infant  was handed to mom.    Delivery was complicated by nothing Interventions included fundal massage.    Delivery QBL (mL): 50    Mother and Infant stable.    Dr. Zamudio was available for consultation and collaboration throughout the entirety of labor and delivery.    VESNA Redd CNM    3/26/2023 9:42 AM

## 2023-03-26 NOTE — ANESTHESIA PREPROCEDURE EVALUATION
Anesthesia Pre-Procedure Evaluation    Patient: Estefany Mejia   MRN: [id number] : 1995        Procedure :           Past Medical History:   Diagnosis Date     Pre-eclampsia 2020      History reviewed. No pertinent surgical history.   No Known Allergies   Social History     Tobacco Use     Smoking status: Never     Smokeless tobacco: Never   Substance Use Topics     Alcohol use: Never      Wt Readings from Last 1 Encounters:   23 71.7 kg (158 lb)        Anesthesia Evaluation            ROS/MED HX  ENT/Pulmonary:       Neurologic:       Cardiovascular:     (+) hypertension----- (-) PIH (Hx of Pre-e, current pregnancy w/out concern)   METS/Exercise Tolerance:     Hematologic:     (+) no anticoagulation therapy, no coagulopathy, no thrombocytopenia,  (-) anemia   Musculoskeletal:       GI/Hepatic:  - neg GI/hepatic ROS     Renal/Genitourinary:       Endo:    (-) obesity   Psychiatric/Substance Use:       Infectious Disease:       Malignancy:       Other:   G2 @ 38w2d requesting labor epidural.    (+) , TOLAC candidate,        Physical Exam    Airway        Mallampati: II       Respiratory Devices and Support         Dental           Cardiovascular             Pulmonary                   OUTSIDE LABS:  CBC:   Lab Results   Component Value Date    HGB 13.1 2023     2023     BMP: No results found for: NA, POTASSIUM, CHLORIDE, CO2, BUN, CR, GLC  COAGS: No results found for: PTT, INR, FIBR  POC: No results found for: BGM, HCG, HCGS  HEPATIC: No results found for: ALBUMIN, PROTTOTAL, ALT, AST, GGT, ALKPHOS, BILITOTAL, BILIDIRECT, SUDEEP  OTHER: No results found for: PH, LACT, A1C, NADIRA, PHOS, MAG, LIPASE, AMYLASE, TSH, T4, T3, CRP, SED    Anesthesia Plan    ASA Status:  2      Anesthesia Type: Epidural.              Consents    Anesthesia Plan(s) and associated risks, benefits, and realistic alternatives discussed. Questions answered and patient/representative(s) expressed understanding.     - Discussed:     - Discussed with:  Patient         Postoperative Care            Comments:    Other Comments: Patient requests labor epidural. Chart reviewed, including labs. Reviewed options and risks with the patient, including but not limited to: bleeding, infection, damage to tissues under the skin(nerves, muscles, blood vessels), hypotension, headache, and epidural failure. Questions answered, consent signed. Patient agrees to elective labor epidural.             Alize Hooker MD

## 2023-03-26 NOTE — PROGRESS NOTES
Patient arrived to unit for rule out labor. She has been christoph since her last appointment on Thursday. The contractions began to increase in frequency and intensity this evening. She has had a previous  section due to fetal intolerance.     Consent was given to place EFM on and perform SVE. Patient declines leaking of fluid, vaginal bleeding, headache, change in vision or abdominal pain outside of contractions. Blood pressure WNL.     Ross Johnson called and at bedside. Plan of care is too admit patient and start GBS antibiotics.

## 2023-03-27 VITALS
RESPIRATION RATE: 18 BRPM | TEMPERATURE: 97.7 F | OXYGEN SATURATION: 98 % | SYSTOLIC BLOOD PRESSURE: 115 MMHG | DIASTOLIC BLOOD PRESSURE: 72 MMHG | HEIGHT: 64 IN | HEART RATE: 68 BPM | BODY MASS INDEX: 26.98 KG/M2 | WEIGHT: 158 LBS

## 2023-03-27 LAB
HGB BLD-MCNC: 11.7 G/DL (ref 11.7–15.7)
HIV 1+2 AB+HIV1 P24 AG SERPL QL IA: NONREACTIVE
HOLD SPECIMEN: NORMAL

## 2023-03-27 PROCEDURE — 250N000013 HC RX MED GY IP 250 OP 250 PS 637: Performed by: ADVANCED PRACTICE MIDWIFE

## 2023-03-27 PROCEDURE — 36415 COLL VENOUS BLD VENIPUNCTURE: CPT | Performed by: ADVANCED PRACTICE MIDWIFE

## 2023-03-27 PROCEDURE — 85018 HEMOGLOBIN: CPT | Performed by: ADVANCED PRACTICE MIDWIFE

## 2023-03-27 RX ORDER — DOCUSATE SODIUM 100 MG/1
100 CAPSULE, LIQUID FILLED ORAL 2 TIMES DAILY PRN
Qty: 30 CAPSULE | Refills: 1 | Status: SHIPPED | OUTPATIENT
Start: 2023-03-27

## 2023-03-27 RX ADMIN — BENZOCAINE AND LEVOMENTHOL: 200; 5 SPRAY TOPICAL at 00:27

## 2023-03-27 RX ADMIN — IBUPROFEN 800 MG: 800 TABLET ORAL at 02:52

## 2023-03-27 RX ADMIN — IBUPROFEN 800 MG: 800 TABLET ORAL at 09:42

## 2023-03-27 ASSESSMENT — ACTIVITIES OF DAILY LIVING (ADL)
ADLS_ACUITY_SCORE: 18

## 2023-03-27 NOTE — DISCHARGE SUMMARY
OB Discharge Summary      Date:  3/27/2023    Name:  Estefany Mejia  :  1995  MRN:  [id number]      Admission Date:  3/25/2023  Delivery Date: 3/26/2023   Gestational Age at Delivery:  38w2d  Discharge Date:  3/27/2023    Principal Diagnosis:    Patient Active Problem List   Diagnosis     Normal labor         Conditions complicating Pregnancy:   1. Hx CS, desires TOLAC  2. GBS positive  3. Hx GHTN  4. EFW 15%    Procedure(s) Performed:  /NSVB with 1st degree laceration    Indication for :  NA  Indication for Induction:  NA     Condition at Discharge:  Stable. Desires discharge home. Fundus is firm at 1 under U. Denies clots, states bleeding is decreasing and she is surprised at how little she has had. Pain is well controlled with current meds. States breastfeeding is going well. Baby is undergoing 24 hour testing at this time. If baby needs to stay the night, will plan on cancelling discharge.    Discharge Medications:      Review of your medicines      START taking      Dose / Directions   docusate sodium 100 MG capsule  Commonly known as: COLACE  Used for: , delivered      Dose: 100 mg  Take 1 capsule (100 mg) by mouth 2 times daily as needed for constipation  Quantity: 30 capsule  Refills: 1        CONTINUE these medicines which have NOT CHANGED      Dose / Directions   PNV Prenatal Plus Multivitamin 27-1 MG Tabs per tablet      Dose: 1 tablet  Take 1 tablet by mouth daily  Refills: 0        STOP taking    aspirin 81 MG chewable tablet  Commonly known as: ASA              Where to get your medicines      Some of these will need a paper prescription and others can be bought over the counter. Ask your nurse if you have questions.    Bring a paper prescription for each of these medications    docusate sodium 100 MG capsule          Discharge Plan:    Follow up with /EAGLE:  In 6 weeks   Patient Instructions:      Physical activity: as tolerated    Diet:  As tolerated    Medication: as  discussed    Other:        Physician/CNM: VESNA Gaitan CNM    Name:  Estefany Pinto Ghassancarolyn  :  1995  MRN:  [id number]

## 2023-03-27 NOTE — PLAN OF CARE
"Problem: Postpartum (Vaginal Delivery)  Goal: Optimal Pain Control and Function  Outcome: Progressing  Intervention: Prevent or Manage Pain  Recent Flowsheet Documentation  Taken 3/27/2023 0019 by Estefany Rodriguez RN  Perineal Care: absorbent brief/pad changed   Problem: Plan of Care - These are the overarching goals to be used throughout the patient stay.    Goal: Optimal Comfort and Wellbeing  Outcome: Progressing  Intervention: Provide Person-Centered Care  Recent Flowsheet Documentation  Taken 3/27/2023 0019 by Estefany Rodriguez RN  Trust Relationship/Rapport:   care explained   choices provided   reassurance provided   thoughts/feelings acknowledged   questions answered   questions encouraged     Problem: Postpartum (Vaginal Delivery)  Goal: Optimal Pain Control and Function  Intervention: Prevent or Manage Pain  Recent Flowsheet Documentation  Taken 3/27/2023 0019 by Estefany Rodriguez RN  Perineal Care: absorbent brief/pad changed    Patient up independently in room. Voiding spontaneously without difficulty. Perineum is slightly swollen and painful. Ice pads, neil bottle, tucks, dermoplast, and ibuprofen utilized and effective at managing pain per patient. Breastfeeding support and encouragement provided. Fundus firm, bleeding WDL. VSS.    /63 (BP Location: Right arm, Patient Position: Semi-Holden's)   Pulse 59   Temp 98.1  F (36.7  C) (Oral)   Resp 16   Ht 1.626 m (5' 4\")   Wt 71.7 kg (158 lb)   LMP 06/20/2022   SpO2 97%   Breastfeeding Unknown   BMI 27.12 kg/m      Estefany Rodriguez RN on 3/27/2023 at 6:02 AM    "

## 2023-03-27 NOTE — PLAN OF CARE
Problem: Plan of Care - These are the overarching goals to be used throughout the patient stay.    Goal: Plan of Care Review  Description: The Plan of Care Review/Shift note should be completed every shift.  The Outcome Evaluation is a brief statement about your assessment that the patient is improving, declining, or no change.  This information will be displayed automatically on your shift note.  Outcome: Adequate for Care Transition     Postpartum vitals stable. Laceration pain minimal, ibuprofen and ice given with relief. Uterus firm at umbilicus. Breastfeeding and supplementing after. Mom encouraged to pump to keep her supply up. Declined lactation follow up today. Offered to come watch baby latch and observe a feeding but was not able to observe. Attentive to baby and cues. Appropriate questions regarding care. AVS reviewed and questions answered. Discharged home.

## 2023-03-27 NOTE — PROGRESS NOTES
"Outreach Nurse Note    Estefany Mejia  [id number]  1995    Chart reviewed, discharge plan discussed with patient's bedside RN, needs assessed. Patient, Estefany, requests home care visit, nurse visit planned for Wednesday, 3/29/23, as ordered for baby, Home Care Intake updated by this writer. Follow-up post-delivery appointment planned [place] 6 weeks [place] [facility].    Estefany is reported to have support at home and is ready to discharge today with , \"Tawanna\". Outreach RN will continue to follow and assist if needed with discharge plan. No additional needs identified at this time.    "

## 2023-03-27 NOTE — DISCHARGE INSTRUCTIONS
You have a Home Care nurse visit planned for Wednesday, 3/29/23. The nurse will contact you after discharge to confirm the appointment time. If you do not hear from the nurse by Wednesday morning, please call [phone]. Do not schedule a clinic appointment on the same day as home nurse visit.

## 2023-03-27 NOTE — PLAN OF CARE
Estefany's VSS.  Her lochia has been scant; FFU/1, no clots. She's independent with self and baby cares. Breastfeeding is going well;  she's able to get baby latched deeply; her nipples are comfortable and intact.  She started pumping this evening and got 24 ml colostrum with first pumping, 15 of which she gave to baby after a breastfeeding.  Estefany and her  are caring for baby together. They express delight in her.     Problem: Postpartum (Vaginal Delivery)  Goal: Hemostasis  Outcome: Progressing     Problem: Postpartum (Vaginal Delivery)  Goal: Successful Maternal Role Transition  Outcome: Progressing

## 2023-04-01 ENCOUNTER — HEALTH MAINTENANCE LETTER (OUTPATIENT)
Age: 28
End: 2023-04-01

## 2023-08-03 ENCOUNTER — MEDICAL CORRESPONDENCE (OUTPATIENT)
Dept: HEALTH INFORMATION MANAGEMENT | Facility: CLINIC | Age: 28
End: 2023-08-03
Payer: COMMERCIAL

## 2024-06-01 ENCOUNTER — HEALTH MAINTENANCE LETTER (OUTPATIENT)
Age: 29
End: 2024-06-01

## 2025-06-14 ENCOUNTER — HEALTH MAINTENANCE LETTER (OUTPATIENT)
Age: 30
End: 2025-06-14